# Patient Record
Sex: FEMALE | Race: BLACK OR AFRICAN AMERICAN
[De-identification: names, ages, dates, MRNs, and addresses within clinical notes are randomized per-mention and may not be internally consistent; named-entity substitution may affect disease eponyms.]

---

## 2020-05-25 ENCOUNTER — HOSPITAL ENCOUNTER (EMERGENCY)
Dept: HOSPITAL 61 - ER | Age: 50
Discharge: OTHER INSTITUTION | End: 2020-05-25
Payer: COMMERCIAL

## 2020-05-25 VITALS — WEIGHT: 198.42 LBS | BODY MASS INDEX: 30.07 KG/M2 | HEIGHT: 68 IN

## 2020-05-25 VITALS — SYSTOLIC BLOOD PRESSURE: 211 MMHG | DIASTOLIC BLOOD PRESSURE: 115 MMHG

## 2020-05-25 DIAGNOSIS — R10.84: Primary | ICD-10-CM

## 2020-05-25 DIAGNOSIS — R11.2: ICD-10-CM

## 2020-05-25 DIAGNOSIS — K57.30: ICD-10-CM

## 2020-05-25 DIAGNOSIS — D25.9: ICD-10-CM

## 2020-05-25 LAB
% LYMPHS: 6 % (ref 24–48)
% MONOS: 1 % (ref 0–10)
% SEGS: 93 % (ref 35–66)
ALBUMIN SERPL-MCNC: 3.8 G/DL (ref 3.4–5)
ALBUMIN/GLOB SERPL: 0.8 {RATIO} (ref 1–1.7)
ALP SERPL-CCNC: 97 U/L (ref 46–116)
ALT SERPL-CCNC: 18 U/L (ref 14–59)
ANION GAP SERPL CALC-SCNC: 9 MMOL/L (ref 6–14)
APTT PPP: YELLOW S
AST SERPL-CCNC: 15 U/L (ref 15–37)
BACTERIA #/AREA URNS HPF: (no result) /HPF
BASOPHILS # BLD AUTO: 0 X10^3/UL (ref 0–0.2)
BASOPHILS NFR BLD: 0 % (ref 0–3)
BILIRUB SERPL-MCNC: 0.5 MG/DL (ref 0.2–1)
BILIRUB UR QL STRIP: NEGATIVE
BUN SERPL-MCNC: 17 MG/DL (ref 7–20)
BUN/CREAT SERPL: 12 (ref 6–20)
CALCIUM SERPL-MCNC: 9.4 MG/DL (ref 8.5–10.1)
CHLORIDE SERPL-SCNC: 99 MMOL/L (ref 98–107)
CO2 SERPL-SCNC: 29 MMOL/L (ref 21–32)
CREAT SERPL-MCNC: 1.4 MG/DL (ref 0.6–1)
EOSINOPHIL NFR BLD: 0 % (ref 0–3)
EOSINOPHIL NFR BLD: 0 X10^3/UL (ref 0–0.7)
ERYTHROCYTE [DISTWIDTH] IN BLOOD BY AUTOMATED COUNT: 13.6 % (ref 11.5–14.5)
FIBRINOGEN PPP-MCNC: CLEAR MG/DL
GFR SERPLBLD BASED ON 1.73 SQ M-ARVRAT: 48.8 ML/MIN
GLOBULIN SER-MCNC: 4.5 G/DL (ref 2.2–3.8)
GLUCOSE SERPL-MCNC: 176 MG/DL (ref 70–99)
HCT VFR BLD CALC: 39.2 % (ref 36–47)
HGB BLD-MCNC: 13.4 G/DL (ref 12–15.5)
LIPASE: 82 U/L (ref 73–393)
LYMPHOCYTES # BLD: 0.8 X10^3/UL (ref 1–4.8)
LYMPHOCYTES NFR BLD AUTO: 6 % (ref 24–48)
MCH RBC QN AUTO: 29 PG (ref 25–35)
MCHC RBC AUTO-ENTMCNC: 34 G/DL (ref 31–37)
MCV RBC AUTO: 85 FL (ref 79–100)
MONO #: 0.7 X10^3/UL (ref 0–1.1)
MONOCYTES NFR BLD: 5 % (ref 0–9)
NEUT #: 12.1 X10^3/UL (ref 1.8–7.7)
NEUTROPHILS NFR BLD AUTO: 89 % (ref 31–73)
NITRITE UR QL STRIP: NEGATIVE
PH UR STRIP: 5.5 [PH]
PLATELET # BLD AUTO: 310 X10^3/UL (ref 140–400)
PLATELET # BLD EST: ADEQUATE 10*3/UL
POTASSIUM SERPL-SCNC: 3.3 MMOL/L (ref 3.5–5.1)
PROT SERPL-MCNC: 8.3 G/DL (ref 6.4–8.2)
PROT UR STRIP-MCNC: >=300 MG/DL
RBC # BLD AUTO: 4.62 X10^6/UL (ref 3.5–5.4)
RBC #/AREA URNS HPF: 0 /HPF (ref 0–2)
SODIUM SERPL-SCNC: 137 MMOL/L (ref 136–145)
SQUAMOUS #/AREA URNS LPF: (no result) /LPF
TOXIC GRANULES BLD QL SMEAR: SLIGHT
UROBILINOGEN UR-MCNC: 0.2 MG/DL
WBC # BLD AUTO: 13.6 X10^3/UL (ref 4–11)
WBC #/AREA URNS HPF: (no result) /HPF (ref 0–4)
WBC TOXIC VACUOLES BLD QL SMEAR: SLIGHT

## 2020-05-25 PROCEDURE — 83690 ASSAY OF LIPASE: CPT

## 2020-05-25 PROCEDURE — 80053 COMPREHEN METABOLIC PANEL: CPT

## 2020-05-25 PROCEDURE — 74176 CT ABD & PELVIS W/O CONTRAST: CPT

## 2020-05-25 PROCEDURE — 96375 TX/PRO/DX INJ NEW DRUG ADDON: CPT

## 2020-05-25 PROCEDURE — 85025 COMPLETE CBC W/AUTO DIFF WBC: CPT

## 2020-05-25 PROCEDURE — 36415 COLL VENOUS BLD VENIPUNCTURE: CPT

## 2020-05-25 PROCEDURE — 81001 URINALYSIS AUTO W/SCOPE: CPT

## 2020-05-25 PROCEDURE — 96361 HYDRATE IV INFUSION ADD-ON: CPT

## 2020-05-25 PROCEDURE — 99285 EMERGENCY DEPT VISIT HI MDM: CPT

## 2020-05-25 PROCEDURE — 96374 THER/PROPH/DIAG INJ IV PUSH: CPT

## 2020-05-25 PROCEDURE — 85007 BL SMEAR W/DIFF WBC COUNT: CPT

## 2020-05-25 NOTE — RAD
EXAM: Abdomen and pelvis CT without intravenous contrast.

 

HISTORY: Pain.

 

TECHNIQUE: Computed tomographic images of the abdomen and pelvis were 

obtained without contrast. Multiplanar reformatting was performed.

 

*One or more of the following individualized dose reduction techniques 

were utilized for this examination:  

1. Automated exposure control.  

2. Adjustment of the mA and/or kV according to patient size.  

3. Use of iterative reconstruction technique.

 

COMPARISON: None.

 

FINDINGS: Evaluation of the lower thorax demonstrates no infiltrate or 

pleural effusion. There is a 1.6 cm nodule containing a calcification 

within the lateral left breast, possibly a degenerating fibroadenoma. 

There is a smaller partially calcified nodule within the medial inferior 

left breast. The heart is normal in size. There is a tiny hiatal hernia. 

No hepatic lesion is seen. The gallbladder is surgically absent. The 

pancreas is unremarkable. There are splenic granulomas. The spleen is 

normal in size. The adrenal glands are unremarkable. The kidneys are 

surgically absent. There is a right pelvic kidney. There is a metallic 

clip adjacent to the transplant kidney. There is no hydronephrosis or 

convincing solid or cystic renal lesion. The appendix is normal in 

appearance. There is colonic diverticulosis. There is no evidence of 

diverticulitis. There are calcified uterine fibroids. The adnexal regions 

are unremarkable. The aorta is normal in caliber. There is evidence of 

umbilical hernia mesh repair. There bilateral lumbar hernias, the right of

which contains fat and the left which contains fat and a segment of 

proximal descending colon. There is no evidence of incarceration or 

obstruction. There is no suspicious osseous lesion.

 

IMPRESSION: 

1. Colonic diverticulosis without evidence of diverticulitis.

2. Right pelvic transplant kidney.

3. Calcified uterine fibroids.

4. Bilateral lumbar hernias evidence of prior umbilical hernia repair.

5. Small nodule with calcification within the left breast, possibly a 

fibroadenoma. Correlate with prior mammography findings.

 

Electronically signed by: Jodee Ramos MD (5/25/2020 7:50 PM) [facility]

## 2020-05-25 NOTE — PHYS DOC
General Adult


EDM:


Chief Complaint:  ABDOMINAL PAIN





HPI:


HPI:





Patient is a 49  year old female who presents with abdominal pain with nausea 

and vomiting that started yesterday.  Patient states that she has not been able 

to keep any medications down.  Patient is on antirejection medications for 

kidney transplant and she has not been able to keep those down.  She complains 

of generalized abdominal pain that she rates at a 10 out of 10.  She states that

this is similar to when she had diverticulitis in the past.  She denies any 

urinary discomfort.  She denies any fever, chest pain or shortness of breath.  

Patient states that nothing is relieving her symptoms.  []





Review of Systems:


Review of Systems:


Constitutional:  Denies fever or chills. []


Respiratory:  Denies cough or shortness of breath. [] 


Cardiovascular:  Denies chest pain or edema. [] 


GI: Complains of abdominal pain with nausea and vomiting. [] 


Neurologic:  Denies headache, focal weakness or sensory changes. [] 





A full 10 point review of systems has been reviewed and is otherwise negative.





Heart Score:


Risk Factors:


Risk Factors:  DM, Current or recent (<one month) smoker, HTN, HLP, family 

history of CAD, obesity.


Risk Scores:


Score 0 - 3:  2.5% MACE over next 6 weeks - Discharge Home


Score 4 - 6:  20.3% MACE over next 6 weeks - Admit for Clinical Observation


Score 7 - 10:  72.7% MACE over next 6 weeks - Early Invasive Strategies





Physical Exam:


PE:





Constitutional: Well developed, well nourished, no acute distress, non-toxic 

appearance. []


HENT: Normocephalic, atraumatic, bilateral external ears normal, oropharynx 

moist, no oral exudates, nose normal. []


Eyes: PERRLA, EOMI, conjunctiva normal, no discharge. [] 


Neck: Normal range of motion, no tenderness, supple. [] 


Cardiovascular: Regular rate and rhythm []


Lungs & Thorax:  Bilateral breath sounds clear to auscultation []


Abdomen: Bowel sounds normal, soft, with moderate diffuse tenderness, greatest 

in the left lower quadrant. [] 


Skin: Warm, dry, no erythema, no rash. [] 


Extremities: No tenderness, no cyanosis, no clubbing, ROM intact. [] 


Neurologic: Alert and oriented X 3, no focal deficits noted. []





EKG:


EKG:


[]





Radiology/Procedures:


Radiology/Procedures:


[]PROCEDURE: CT ABDOMEN PELVIS WO CONTRAST





 


EXAM: Abdomen and pelvis CT without intravenous contrast.


 


HISTORY: Pain.


 


TECHNIQUE: Computed tomographic images of the abdomen and pelvis were 


obtained without contrast. Multiplanar reformatting was performed.


 


*One or more of the following individualized dose reduction techniques 


were utilized for this examination:  


1. Automated exposure control.  


2. Adjustment of the mA and/or kV according to patient size.  


3. Use of iterative reconstruction technique.


 


COMPARISON: None.


 


FINDINGS: Evaluation of the lower thorax demonstrates no infiltrate or 


pleural effusion. There is a 1.6 cm nodule containing a calcification 


within the lateral left breast, possibly a degenerating fibroadenoma. 


There is a smaller partially calcified nodule within the medial inferior 


left breast. The heart is normal in size. There is a tiny hiatal hernia. 


No hepatic lesion is seen. The gallbladder is surgically absent. The 


pancreas is unremarkable. There are splenic granulomas. The spleen is 


normal in size. The adrenal glands are unremarkable. The kidneys are 


surgically absent. There is a right pelvic kidney. There is a metallic 


clip adjacent to the transplant kidney. There is no hydronephrosis or 


convincing solid or cystic renal lesion. The appendix is normal in 


appearance. There is colonic diverticulosis. There is no evidence of 


diverticulitis. There are calcified uterine fibroids. The adnexal regions 


are unremarkable. The aorta is normal in caliber. There is evidence of 


umbilical hernia mesh repair. There bilateral lumbar hernias, the right of


which contains fat and the left which contains fat and a segment of 


proximal descending colon. There is no evidence of incarceration or 


obstruction. There is no suspicious osseous lesion.


 


IMPRESSION: 


1. Colonic diverticulosis without evidence of diverticulitis.


2. Right pelvic transplant kidney.


3. Calcified uterine fibroids.


4. Bilateral lumbar hernias evidence of prior umbilical hernia repair.


5. Small nodule with calcification within the left breast, possibly a 


fibroadenoma. Correlate with prior mammography findings.


 


Electronically signed by: Jodee Ramos MD (5/25/2020 7:50 PM) [facility]














DICTATED and SIGNED BY:     JODEE RAMOS MD


DATE:     05/25/20 1950





Course & Med Decision Making:


Course & Med Decision Making


Pertinent Labs and Imaging studies reviewed. (See chart for details)





[]





Dragon Disclaimer:


Dragon Disclaimer:


This electronic medical record was generated, in whole or in part, using a voice

 recognition dictation system.





Departure


Departure


Impression:  


   Primary Impression:  


   Generalized abdominal pain


   Additional Impression:  


   Nausea and vomiting


   Qualified Codes:  R11.2 - Nausea with vomiting, unspecified


Disposition:  09 ADMITTED AS INPATIENT


Condition:  STABLE


Referrals:  


UNKNOWN PCP NAME (PCP)


Patient Instructions:  Abdominal Pain, Nausea and Vomiting


Scripts


Ondansetron (ONDANSETRON ODT) 4 Mg Tab.rapdis


1 TAB PO PRN Q6-8HRS PRN for NAUSEA, #15 TAB


   Prov: YOLANDA WALDEN Jr. DO         5/25/20 


Hydrocodone/Apap 5-325 (NORCO 5-325 TABLET) 1 Each Tablet


1-2 EACH PO PRN Q6HRS PRN for PAIN, #15


   as needed for pain


   Prov: YOLANDA WALDEN Jr. DO         5/25/20











YOLANDA WALDEN Jr. DO          May 25, 2020 19:09